# Patient Record
Sex: MALE | URBAN - METROPOLITAN AREA
[De-identification: names, ages, dates, MRNs, and addresses within clinical notes are randomized per-mention and may not be internally consistent; named-entity substitution may affect disease eponyms.]

---

## 2024-01-19 ENCOUNTER — OFFICE VISIT (OUTPATIENT)
Dept: URBAN - METROPOLITAN AREA CLINIC 23 | Facility: CLINIC | Age: 39
End: 2024-01-19
Payer: COMMERCIAL

## 2024-01-19 ENCOUNTER — DASHBOARD ENCOUNTERS (OUTPATIENT)
Age: 39
End: 2024-01-19

## 2024-01-19 VITALS
WEIGHT: 180 LBS | SYSTOLIC BLOOD PRESSURE: 115 MMHG | BODY MASS INDEX: 22.38 KG/M2 | HEIGHT: 75 IN | HEART RATE: 88 BPM | DIASTOLIC BLOOD PRESSURE: 73 MMHG | TEMPERATURE: 99.1 F

## 2024-01-19 DIAGNOSIS — K50.90 CROHN'S DISEASE WITHOUT COMPLICATION, UNSPECIFIED GASTROINTESTINAL TRACT LOCATION: ICD-10-CM

## 2024-01-19 DIAGNOSIS — C62.90 SEMINOMA: ICD-10-CM

## 2024-01-19 PROBLEM — 34000006: Status: ACTIVE | Noted: 2024-01-19

## 2024-01-19 PROBLEM — 443675005: Status: ACTIVE | Noted: 2024-01-19

## 2024-01-19 PROCEDURE — 99202 OFFICE O/P NEW SF 15 MIN: CPT | Performed by: INTERNAL MEDICINE

## 2024-01-19 NOTE — HPI-TODAY'S VISIT:
37 yo male  diagnosed originally at the age of 15 -took meds orally at 20- had to have a bowel resection of both small and large intestine due to perforation, with psoas abscess -started remicade at age 21 -started inflectra 2 years ago overall has been well controlled -no pain.  baseline 1 bm a day, well formed. no blood in the stool -was diagnosed with seminoma stage 4 in 2016 - still has present tumors in abdominal cavity.  has done chemotherapy with [facility].   he was declared cancer free 5 years ago.  was treated by a team at [facility].  in the past 3 years has been seen -has inflectra every 8 weeks  , just had 2 days ago